# Patient Record
Sex: FEMALE | Race: WHITE | Employment: PART TIME | URBAN - METROPOLITAN AREA
[De-identification: names, ages, dates, MRNs, and addresses within clinical notes are randomized per-mention and may not be internally consistent; named-entity substitution may affect disease eponyms.]

---

## 2024-03-05 ENCOUNTER — TELEPHONE (OUTPATIENT)
Age: 24
End: 2024-03-05

## 2024-03-06 ENCOUNTER — TELEPHONE (OUTPATIENT)
Age: 24
End: 2024-03-06

## 2024-04-08 ENCOUNTER — TELEMEDICINE (OUTPATIENT)
Age: 24
End: 2024-04-08
Payer: COMMERCIAL

## 2024-04-08 DIAGNOSIS — F90.2 ATTENTION DEFICIT HYPERACTIVITY DISORDER (ADHD), COMBINED TYPE: Primary | ICD-10-CM

## 2024-04-08 LAB
11-NOR-9-THC-9-COOH, POC: NEGATIVE
AMPHETAMINE, URINE, POC: NEGATIVE
BENZODIAZEPINES, URINE, POC: NORMAL
BENZOYLECGONINE, URINE, POC: NEGATIVE
METHADONE, URINE, POC: NEGATIVE
METHAMPHETAMINE, URINE, POC: NEGATIVE
MORPHINE, URINE, POC: NEGATIVE
PHENCYCLIDINE, URINE, POC: NEGATIVE
URINALYSIS COLOR, POC: NORMAL

## 2024-04-08 PROCEDURE — 80305 DRUG TEST PRSMV DIR OPT OBS: CPT | Performed by: STUDENT IN AN ORGANIZED HEALTH CARE EDUCATION/TRAINING PROGRAM

## 2024-04-08 PROCEDURE — 99214 OFFICE O/P EST MOD 30 MIN: CPT | Performed by: STUDENT IN AN ORGANIZED HEALTH CARE EDUCATION/TRAINING PROGRAM

## 2024-04-08 RX ORDER — ESOMEPRAZOLE MAGNESIUM 40 MG/1
40 CAPSULE, DELAYED RELEASE ORAL
COMMUNITY

## 2024-04-08 NOTE — PROGRESS NOTES
Chief Complaint   Patient presents with    ADHD         \"Have you been to the ER, urgent care clinic since your last visit?  Hospitalized since your last visit?\"    NO    “Have you seen or consulted any other health care providers outside of [facility] since your last visit?”    NO            Click Here for Release of Records Request    
tretinoin (RETIN-A) 0.025 % cream APPLY A THIN FILM TO AFFECTED AREA ONCE DAILY IN THE EVENING    omeprazole (PRILOSEC OTC) 20 MG tablet Take 1 tablet by mouth daily (Patient not taking: Reported on 3/4/2024)     No current facility-administered medications for this visit.       Physical Exam  Nursing note reviewed.   Constitutional:       General: She is not in acute distress.     Appearance: Normal appearance. She is not ill-appearing or toxic-appearing.   HENT:      Head: Normocephalic and atraumatic.   Eyes:      General:         Right eye: No discharge.         Left eye: No discharge.      Conjunctiva/sclera: Conjunctivae normal.   Pulmonary:      Effort: Pulmonary effort is normal. No respiratory distress (able to speak in complete sentences).   Musculoskeletal:      Cervical back: No rigidity.   Neurological:      Mental Status: She is alert.      Comments: No obvious neurological deficits within the limits of virtual encounter.   Psychiatric:         Mood and Affect: Mood normal.         Behavior: Behavior normal.         Thought Content: Thought content normal.         Judgment: Judgment normal.               I was located in the office while conducting this encounter.    Total Time: minutes: 11-20 minutes      Chelo Davidson is a 23 y.o. female who was evaluated through a synchronous (real-time) audio-video encounter. The patient (or guardian if applicable) is aware that this is a billable service, which includes applicable co-pays. This Virtual Visit was conducted with patient's (and/or legal guardian's) consent. Patient identification was verified, and a caregiver was present when appropriate.     --Alisa Jang MD on 4/8/24 at 11:23 AM EDT      An electronic signature was used to authenticate this note.

## 2024-04-09 ENCOUNTER — CLINICAL DOCUMENTATION (OUTPATIENT)
Age: 24
End: 2024-04-09

## 2024-04-09 NOTE — PROGRESS NOTES
[facility] office note was put on Dr Jang's desk

## 2024-04-10 RX ORDER — DEXTROAMPHETAMINE SACCHARATE, AMPHETAMINE ASPARTATE MONOHYDRATE, DEXTROAMPHETAMINE SULFATE AND AMPHETAMINE SULFATE 2.5; 2.5; 2.5; 2.5 MG/1; MG/1; MG/1; MG/1
10 CAPSULE, EXTENDED RELEASE ORAL DAILY
Qty: 30 CAPSULE | Refills: 0 | Status: SHIPPED | OUTPATIENT
Start: 2024-04-10 | End: 2024-05-10

## 2024-04-11 ENCOUNTER — TELEPHONE (OUTPATIENT)
Age: 24
End: 2024-04-11

## 2024-04-11 NOTE — TELEPHONE ENCOUNTER
Pt called in and states she needs a PA for amphetamine-dextroamphetamine (ADDERALL XR) 10 MG extended release capsule due to insurance refusing to pay due to her age.

## 2024-04-15 ENCOUNTER — CLINICAL DOCUMENTATION (OUTPATIENT)
Age: 24
End: 2024-04-15

## 2024-04-22 ENCOUNTER — TELEMEDICINE (OUTPATIENT)
Age: 24
End: 2024-04-22
Payer: COMMERCIAL

## 2024-04-22 DIAGNOSIS — F90.2 ATTENTION DEFICIT HYPERACTIVITY DISORDER (ADHD), COMBINED TYPE: Primary | ICD-10-CM

## 2024-04-22 PROCEDURE — 99214 OFFICE O/P EST MOD 30 MIN: CPT | Performed by: STUDENT IN AN ORGANIZED HEALTH CARE EDUCATION/TRAINING PROGRAM

## 2024-04-22 RX ORDER — DEXTROAMPHETAMINE SACCHARATE, AMPHETAMINE ASPARTATE MONOHYDRATE, DEXTROAMPHETAMINE SULFATE AND AMPHETAMINE SULFATE 3.75; 3.75; 3.75; 3.75 MG/1; MG/1; MG/1; MG/1
15 CAPSULE, EXTENDED RELEASE ORAL DAILY
Qty: 15 CAPSULE | Refills: 0 | Status: SHIPPED | OUTPATIENT
Start: 2024-04-22 | End: 2024-05-07

## 2024-04-22 NOTE — PROGRESS NOTES
Progress Note    she is a 23 y.o. year old female who presents for evaluation ADHD (Patient states the medication is not lasting as long as she feels it should. ) and Medication Refill        Assessment/ Plan:     1. Attention deficit hyperactivity disorder (ADHD), combined type  Assessment & Plan:   Uncontrolled, changes made today: Increase Adderall dose  Orders:  -     amphetamine-dextroamphetamine (ADDERALL XR) 15 MG extended release capsule; Take 1 capsule by mouth daily for 15 days. Max Daily Amount: 15 mg, Disp-15 capsule, R-0Normal         Return in about 2 weeks (around 5/6/2024) for Follow-up ADHD, virtual visit fine.      I have discussed the diagnosis with the patient and the intended plan as seen in the above orders.    Medication Side Effects and Warnings were discussed with patient  The patient was instructed to access after-visit summary via [employer] and questions were answered concerning future plans.    Patient conveyed understanding of plan.           Subjective:     Chief Complaint   Patient presents with    ADHD     Patient states the medication is not lasting as long as she feels it should.     Medication Refill       The patient was located at Home: [id number] [place] [address]  [address]  [facility] [id number]    Started on Adderall  She feels like it has helped immensely, \"it has changed my life\"  She got an A on a test for the first time.  She is retaining information and studying better  She hits a wall around 8 hours, feels tired and needs a nap  No issues with insomnia or other side effects.  She is taking 1 day off      PMHx, RxHx, FmHx, SocHx, AllgHx were reviewed and updated in the chart.    Review of Systems - negative except as noted above      Objective:     Patient-Reported Vitals  Patient-Reported Weight: 153  Patient-Reported Height: 5’e         Current Outpatient Medications   Medication Sig    amphetamine-dextroamphetamine (ADDERALL XR) 15 MG extended release capsule Take 1 capsule by mouth

## 2024-04-22 NOTE — PROGRESS NOTES
Chief Complaint   Patient presents with    ADHD     Patient states the medication is not lasting as long as she feels it should.     Medication Refill     \"Have you been to the ER, urgent care clinic since your last visit?  Hospitalized since your last visit?\"    NO    “Have you seen or consulted any other health care providers outside of [facility] since your last visit?”    NO            Click Here for Release of Records Request

## 2024-05-06 ENCOUNTER — TELEMEDICINE (OUTPATIENT)
Age: 24
End: 2024-05-06
Payer: COMMERCIAL

## 2024-05-06 DIAGNOSIS — F90.2 ATTENTION DEFICIT HYPERACTIVITY DISORDER (ADHD), COMBINED TYPE: Primary | ICD-10-CM

## 2024-05-06 PROCEDURE — 99214 OFFICE O/P EST MOD 30 MIN: CPT | Performed by: STUDENT IN AN ORGANIZED HEALTH CARE EDUCATION/TRAINING PROGRAM

## 2024-05-06 RX ORDER — DEXTROAMPHETAMINE SACCHARATE, AMPHETAMINE ASPARTATE MONOHYDRATE, DEXTROAMPHETAMINE SULFATE AND AMPHETAMINE SULFATE 3.75; 3.75; 3.75; 3.75 MG/1; MG/1; MG/1; MG/1
15 CAPSULE, EXTENDED RELEASE ORAL DAILY
Qty: 30 CAPSULE | Refills: 0 | Status: SHIPPED | OUTPATIENT
Start: 2024-07-05 | End: 2024-08-04

## 2024-05-06 RX ORDER — DEXTROAMPHETAMINE SACCHARATE, AMPHETAMINE ASPARTATE MONOHYDRATE, DEXTROAMPHETAMINE SULFATE AND AMPHETAMINE SULFATE 3.75; 3.75; 3.75; 3.75 MG/1; MG/1; MG/1; MG/1
15 CAPSULE, EXTENDED RELEASE ORAL DAILY
Qty: 30 CAPSULE | Refills: 0 | Status: SHIPPED | OUTPATIENT
Start: 2024-06-05 | End: 2024-07-05

## 2024-05-06 RX ORDER — DEXTROAMPHETAMINE SACCHARATE, AMPHETAMINE ASPARTATE MONOHYDRATE, DEXTROAMPHETAMINE SULFATE AND AMPHETAMINE SULFATE 3.75; 3.75; 3.75; 3.75 MG/1; MG/1; MG/1; MG/1
15 CAPSULE, EXTENDED RELEASE ORAL DAILY
Qty: 30 CAPSULE | Refills: 0 | Status: SHIPPED | OUTPATIENT
Start: 2024-05-06 | End: 2024-06-05

## 2024-05-06 ASSESSMENT — PATIENT HEALTH QUESTIONNAIRE - PHQ9
SUM OF ALL RESPONSES TO PHQ9 QUESTIONS 1 & 2: 0
SUM OF ALL RESPONSES TO PHQ QUESTIONS 1-9: 0
1. LITTLE INTEREST OR PLEASURE IN DOING THINGS: NOT AT ALL
SUM OF ALL RESPONSES TO PHQ QUESTIONS 1-9: 0
2. FEELING DOWN, DEPRESSED OR HOPELESS: NOT AT ALL

## 2024-05-06 NOTE — PATIENT INSTRUCTIONS
Constipation management:    First goal is to make sure that all stools are soft. This can be accomplished with prunes/juice, fiber supplements or bulking laxatives such as MiraLAX and Metamucil.  You need to make sure you drink plenty of water in order for these to work.  You can increase these as needed to achieve soft stools, back off for diarrhea.     Start taking over-the-counter Colace daily.  This will help to moisten and lubricate the stools.     Once stools are soft consistency, I recommend taking senna for 1-5 days to help push the bowels along.  After you feel that your bowels have cleared, this can be taken as needed to help.  I would recommend 1-4 times per week.     Other options to help include docusate suppository or a Fleets enema.  These will typically work faster if you are wishing to do more in addition to the above recommendations.     Expect it will take 1-2 weeks for you to fully clear out.  After clear out, it is a good idea to continue the fiber and Colace to help maintain your bowels.

## 2024-05-06 NOTE — PROGRESS NOTES
Chief Complaint   Patient presents with    Medication Refill     New Dose of Adderall is working well.        Patient is here in [place] for the virtual visit.     Patient gave verbal consent today for CHUY.      \"Have you been to the ER, urgent care clinic since your last visit?  Hospitalized since your last visit?\"    NO    “Have you seen or consulted any other [facility]?”    NO            Click Here for Release of Records Request    
11:22 AM EDT  An electronic signature was used to authenticate this note.

## 2024-07-03 ENCOUNTER — TELEMEDICINE (OUTPATIENT)
Age: 24
End: 2024-07-03
Payer: COMMERCIAL

## 2024-07-03 DIAGNOSIS — F90.2 ATTENTION DEFICIT HYPERACTIVITY DISORDER (ADHD), COMBINED TYPE: Primary | ICD-10-CM

## 2024-07-03 PROCEDURE — 99214 OFFICE O/P EST MOD 30 MIN: CPT | Performed by: NURSE PRACTITIONER

## 2024-07-03 RX ORDER — DEXTROAMPHETAMINE SACCHARATE, AMPHETAMINE ASPARTATE, DEXTROAMPHETAMINE SULFATE AND AMPHETAMINE SULFATE 2.5; 2.5; 2.5; 2.5 MG/1; MG/1; MG/1; MG/1
10 TABLET ORAL DAILY
Qty: 30 TABLET | Refills: 0 | Status: SHIPPED | OUTPATIENT
Start: 2024-07-03 | End: 2024-08-02

## 2024-07-03 NOTE — PROGRESS NOTES
Chief Complaint   Patient presents with    ADD    Medication Adjustment     Patient is on virtual today for a med adjustment.  Patient stated she would like to try adderall IR.  No other concerns    Patient stated she  is in the [facility] at the time of this virtual visit.      \"Have you been to the ER, urgent care clinic since your last visit?  Hospitalized since your last visit?\"    NO    “Have you seen or consulted any other health care providers outside of [facility] since your last visit?”    NO     “Have you had a pap smear?”    NO    No cervical cancer screening on file             Click Here for Release of Records Request

## 2024-07-03 NOTE — PROGRESS NOTES
Chelo Davidson (:  2000) is a 23 y.o. female, Established patient, here for evaluation of the following chief complaint(s):  ADD and Medication Adjustment         Assessment & Plan  1. ADD.  A prescription for Adderall IR 10 mg, to be taken 30 minutes prior to work or productive activity, has been issued.    Advised that I did not want her taking XR and IR, against FDA recommendations  She is also advised to use IR only when active and not get in the habit of taking bid every day to decrease abuse potential  Will follow up in 1 mo for progress    Results    1. Attention deficit hyperactivity disorder (ADHD), combined type  -     amphetamine-dextroamphetamine (ADDERALL, 10MG,) 10 MG tablet; Take 1 tablet by mouth daily for 30 days. Max Daily Amount: 10 mg, Disp-30 tablet, R-0Normal    No follow-ups on file.       Subjective   History of Present Illness  The patient presents via virtual visit for evaluation of ADD.    The patient expresses a desire to transition from Adderall XR to Adderall IR, citing palpitations as a side effect. She maintains a daily workout routine and is currently training for a marathon in 2024. However, she occasionally finds herself not completing her workout until 2 p.m. and experiencing insomnia due to the late-day administration of the XR.. She expresses a preference for Adderall IR, which she believes would provide more flexibility in maintaining her workout routine. She prefers to take the medication during her work hours, three days a week, and estimates taking it four times a week when she is not engaged in any activities. Her 12-hour shifts, which initially provide relief for the full 12 hours, then last until approximately 10 p.m., and then 8 p.m. She took her medication at 6 a.m. yesterday at 10 a.m., but found it difficult to maintain focus. She believes her body is metabolizing the medication rapidly. She denies ever taking Vyvanse or Concerta, but expresses

## 2024-08-06 ENCOUNTER — TELEMEDICINE (OUTPATIENT)
Age: 24
End: 2024-08-06
Payer: COMMERCIAL

## 2024-08-06 DIAGNOSIS — M25.562 PAIN AND SWELLING OF LEFT KNEE: ICD-10-CM

## 2024-08-06 DIAGNOSIS — M25.462 PAIN AND SWELLING OF LEFT KNEE: ICD-10-CM

## 2024-08-06 DIAGNOSIS — F90.2 ATTENTION DEFICIT HYPERACTIVITY DISORDER (ADHD), COMBINED TYPE: Primary | ICD-10-CM

## 2024-08-06 PROCEDURE — 99214 OFFICE O/P EST MOD 30 MIN: CPT | Performed by: STUDENT IN AN ORGANIZED HEALTH CARE EDUCATION/TRAINING PROGRAM

## 2024-08-06 RX ORDER — LISDEXAMFETAMINE DIMESYLATE 20 MG/1
20 CAPSULE ORAL DAILY
Qty: 30 CAPSULE | Refills: 0 | Status: SHIPPED | OUTPATIENT
Start: 2024-08-06 | End: 2024-09-05

## 2024-08-06 NOTE — PROGRESS NOTES
Progress Note    she is a 23 y.o. year old female who presents for evaluation ADHD (3 month follow up)        Assessment/ Plan:     Assessment & Plan  1. Attention Deficit Hyperactivity Disorder (ADHD).  Given her partial response to Adderall, it is advisable to continue with the same class of medication but consider a different variant. A prescription for Vyvanse 20 mg has been issued to her pharmacy, [facility] on [address]. She is informed that Vyvanse may have similar side effects to Adderall, such as headaches and appetite suppression, and is advised to avoid caffeine and eat food when taking the medication. She is advised to schedule a virtual visit via [employer] within the next 2 to 4 weeks to assess her response to the new medication.    2. Left knee pain.  The pain is likely due to a strain and should improve with rest and rehabilitation exercises. An x-ray of the left knee has been ordered to rule out any fractures. She is advised to apply ice to the knee four times daily for 10 minutes each time, particularly before and after activities that may exacerbate the pain. Wearing a compression bandage and elevating the knee can help reduce swelling and discomfort. She is currently taking ibuprofen for pain management. A work note has been provided, excusing her from work until Friday. Rehabilitation exercises will be included in her after-visit summary. If the x-ray reveals a fracture, a referral to Orthopedics will be made. If the pain persists despite the recommended treatment, a referral to Physical Therapy will be considered.    Follow-up  The patient will follow up in 2 to 4 weeks.       1. Attention deficit hyperactivity disorder (ADHD), combined type  -     Lisdexamfetamine Dimesylate (VYVANSE) 20 MG CAPS; Take 1 capsule by mouth daily for 30 days. Max Daily Amount: 20 mg, Disp-30 capsule, R-0Normal  2. Pain and swelling of left knee  -     XR KNEE LEFT (MIN 4 VIEWS); Future         Return in about 4 weeks

## 2024-08-06 NOTE — PROGRESS NOTES
Patient has given verbal consent to use CHUY today.    Chelo Davidson is a 23 y.o. female , id x 2(name and ). Reviewed record, history, and  medications.    Chief Complaint   Patient presents with    ADHD     3 month follow up          Health Maintenance Due   Topic    Hepatitis B vaccine (1 of 3 - 3-dose series)    COVID-19 Vaccine (1)    Varicella vaccine (1 of 2 - 2-dose childhood series)    HIV screen     Hepatitis C screen     DTaP/Tdap/Td vaccine (1 - Tdap)    HPV vaccine (2 - 3-dose series)    Pap smear     Flu vaccine (1)         Wt Readings from Last 1 Encounters:   23 70.8 kg (156 lb)     BP Readings from Last 3 Encounters:   23 121/82   23 114/76   23 101/69     Pulse Readings from Last 1 Encounters:   23 97         Patient is currently accompanied by n/a & I have received verbal consent from Chelo Davidson to discuss any/all medical information while he/she is present in the room.    Home BP cuff present today:  no  Home medication list or bottles present today: no  Forms for completion: no    Chest pain/pressure/discomfort: no  Shortness of breath:  no      Depression Screening:  :     Depression: Not at risk (2024)    PHQ-2     PHQ-2 Score: 0   Recent Concern: Depression - At risk (3/4/2024)    PHQ-2     PHQ-2 Score: 4          Coordination of Care Questionnaire:  :     \"Have you been to the ER, urgent care clinic since your last visit?  Hospitalized since your last visit?\"    NO    “Have you seen or consulted any other health care providers outside of [facility] since your last visit?”    NO     “Have you had a pap smear?”    NO    No cervical cancer screening on file             Click Here for Release of Records Request        --Meghan Aleman MA       ------------------------------------------------

## 2024-08-20 ENCOUNTER — TELEMEDICINE (OUTPATIENT)
Age: 24
End: 2024-08-20
Payer: COMMERCIAL

## 2024-08-20 ENCOUNTER — TELEPHONE (OUTPATIENT)
Age: 24
End: 2024-08-20

## 2024-08-20 DIAGNOSIS — F90.2 ATTENTION DEFICIT HYPERACTIVITY DISORDER (ADHD), COMBINED TYPE: ICD-10-CM

## 2024-08-20 PROCEDURE — 99214 OFFICE O/P EST MOD 30 MIN: CPT | Performed by: STUDENT IN AN ORGANIZED HEALTH CARE EDUCATION/TRAINING PROGRAM

## 2024-08-20 RX ORDER — LISDEXAMFETAMINE DIMESYLATE 40 MG/1
40 CAPSULE ORAL DAILY
Qty: 30 CAPSULE | Refills: 0 | Status: SHIPPED | OUTPATIENT
Start: 2024-08-27 | End: 2024-09-26

## 2024-08-20 NOTE — TELEPHONE ENCOUNTER
Patient called and states that the Vyvanse is only working for 5 hours. She would like a call back to advise if the medication should be increased. Please call her at [phone]

## 2024-08-20 NOTE — PROGRESS NOTES
Patient confirms they are located here in [place] for this virtual visit today.    Patient gave verbal consent today for CHUY to be used during there virtual visit.    Records were requested today on the Patient's behalf.    Chelo Davidson is a 24 y.o. female , id x 2(name and ). Reviewed record, history, and  medications.    Chief Complaint   Patient presents with    Medication Check    1. Medication is only working for a short period of time. (Vyvanse)      Health Maintenance Due   Topic    Varicella vaccine (1 of 2 - 13+ 2-dose series)    HIV screen     Hepatitis C screen     Hepatitis B vaccine (1 of 3 - 19+ 3-dose series)    DTaP/Tdap/Td vaccine (1 - Tdap)    HPV vaccine (2 - 3-dose series)    Pap smear     COVID-19 Vaccine ( -  season)    Flu vaccine (1)         Wt Readings from Last 1 Encounters:   23 70.8 kg (156 lb)     BP Readings from Last 3 Encounters:   23 121/82   23 114/76   23 101/69     Pulse Readings from Last 1 Encounters:   23 97         Patient is currently accompanied by n/a & I have received verbal consent from Chelo Davidson to discuss any/all medical information while he/she is present in the room.    Home BP cuff present today:  no  Home medication list or bottles present today: no  Forms for completion: no    Chest pain/pressure/discomfort: no  Shortness of breath:  no      Depression Screening:  :     Depression: Not at risk (2024)    PHQ-2     PHQ-2 Score: 0   Recent Concern: Depression - At risk (3/4/2024)    PHQ-2     PHQ-2 Score: 4          Coordination of Care Questionnaire:  :     \"Have you been to the ER, urgent care clinic since your last visit?  Hospitalized since your last visit?\"    NO    “Have you seen or consulted any other health care providers outside of [facility] since your last visit?”    NO     “Have you had a pap smear?”    YES - Where: [facility] or [facility]  Nurse/CMA to request most recent records if not

## 2024-08-21 NOTE — PROGRESS NOTES
Virtual Visit Progress Note    she is a 24 y.o. year old female who presents for evaluation Medication Check        Assessment/ Plan:     Assessment & Plan  1. Attention-Deficit/Hyperactivity Disorder (ADHD).  The current dosage of Vyvanse is not lasting long enough, causing fatigue and distraction after five hours. She reports positive effects otherwise, including the ability to take antacids concurrently and maintaining an appetite. The dosage of Vyvanse will be increased to 40 mg, with two 20 mg doses to be taken in the morning. A prescription for the adjusted dosage will be sent to [facility] on [address]. She is advised to report any side effects experienced with this new regimen. If side effects occur, further adjustments will be considered.    2. Patellofemoral Syndrome.  She is currently undergoing physical therapy and continues to exercise daily. No changes to the current management plan are needed.       1. Attention deficit hyperactivity disorder (ADHD), combined type  -     Lisdexamfetamine Dimesylate 40 MG CAPS; Take 40 mg by mouth daily for 30 days. Max Daily Amount: 40 mg, Disp-30 capsule, R-0Normal         Return for as scheduled 9/3.      I have discussed the diagnosis with the patient and the intended plan as seen in the above orders.    Medication Side Effects and Warnings were discussed with patient  The patient was instructed to access after-visit summary via [employer] and questions were answered concerning future plans.    Patient conveyed understanding of plan.       Current Outpatient Medications   Medication Sig    [START ON 8/27/2024] Lisdexamfetamine Dimesylate 40 MG CAPS Take 40 mg by mouth daily for 30 days. Max Daily Amount: 40 mg    lansoprazole (PREVACID SOLUTAB) 15 MG disintegrating tablet Take 1 tablet by mouth daily    Norethindron-Ethinyl Estrad-Fe (TRI-LEGEST FE) 1-20/1-30/1-35 MG-MCG TABS Take 1 tablet by mouth daily    tretinoin (RETIN-A) 0.025 % cream APPLY A THIN FILM TO AFFECTED

## 2024-08-22 ENCOUNTER — CLINICAL DOCUMENTATION (OUTPATIENT)
Age: 24
End: 2024-08-22

## 2024-08-28 ENCOUNTER — TELEPHONE (OUTPATIENT)
Age: 24
End: 2024-08-28

## 2024-08-28 DIAGNOSIS — F90.2 ATTENTION DEFICIT HYPERACTIVITY DISORDER (ADHD), COMBINED TYPE: Primary | ICD-10-CM

## 2024-08-28 RX ORDER — LISDEXAMFETAMINE DIMESYLATE 40 MG/1
40 CAPSULE ORAL DAILY
Qty: 30 CAPSULE | Refills: 0 | Status: SHIPPED | OUTPATIENT
Start: 2024-08-28 | End: 2024-09-27

## 2024-08-28 NOTE — TELEPHONE ENCOUNTER
Pt is calling about her Lisdexamfetamine Dimesylate 40 MG CAPS . [facility] is telling pt that her insurance will pay for the vyvanse name brand pt is at the [facility] waiting

## 2024-08-28 NOTE — TELEPHONE ENCOUNTER
Spoke to the patient , informed her of the new script that was sent in.  She stated the pharmacy said the script must say Brand name is Medically necessary.

## 2024-09-03 ENCOUNTER — TELEMEDICINE (OUTPATIENT)
Age: 24
End: 2024-09-03
Payer: COMMERCIAL

## 2024-09-03 DIAGNOSIS — F90.2 ATTENTION DEFICIT HYPERACTIVITY DISORDER (ADHD), COMBINED TYPE: ICD-10-CM

## 2024-09-03 PROCEDURE — 99214 OFFICE O/P EST MOD 30 MIN: CPT | Performed by: STUDENT IN AN ORGANIZED HEALTH CARE EDUCATION/TRAINING PROGRAM

## 2024-09-03 RX ORDER — LISDEXAMFETAMINE DIMESYLATE 40 MG/1
40 CAPSULE ORAL DAILY
Qty: 30 CAPSULE | Refills: 0 | Status: SHIPPED | OUTPATIENT
Start: 2024-09-27 | End: 2024-10-27

## 2024-09-03 RX ORDER — LISDEXAMFETAMINE DIMESYLATE 40 MG/1
40 CAPSULE ORAL DAILY
Qty: 30 CAPSULE | Refills: 0 | Status: SHIPPED | OUTPATIENT
Start: 2024-11-25 | End: 2024-11-28

## 2024-09-03 RX ORDER — LISDEXAMFETAMINE DIMESYLATE 40 MG/1
40 CAPSULE ORAL DAILY
Qty: 30 CAPSULE | Refills: 0 | Status: SHIPPED | OUTPATIENT
Start: 2024-10-26 | End: 2024-11-25

## 2024-09-03 NOTE — PROGRESS NOTES
1-20/1-30/1-35 MG-MCG TABS Take 1 tablet by mouth daily    tretinoin (RETIN-A) 0.025 % cream APPLY A THIN FILM TO AFFECTED AREA ONCE DAILY IN THE EVENING     No current facility-administered medications for this visit.         Subjective:     Chief Complaint   Patient presents with    Medication Refill       History of Present Illness  The patient presents for evaluation of multiple medical concerns.    She prefers name brand Vyvanse over generic form. She reports no side effects from the medication.   She began experiencing a runny nose and slight sneezing yesterday. She is uncertain if these symptoms are due to allergies or a cold. She is seeking advice on which medications she can take in conjunction with Vyvanse.   She typically does not typically exercise before taking her medication. However, she noticed an elevated heart rate after running after her medication and an exam yesterday.         PMHx, RxHx, FmHx, SocHx, AllgHx were reviewed and updated in the chart.    Review of Systems - negative except as noted above      Objective:     Patient-Reported Vitals  No data recorded     Physical Exam  Nursing note reviewed.   Constitutional:       General: She is not in acute distress.     Appearance: Normal appearance. She is not ill-appearing or toxic-appearing.   HENT:      Head: Normocephalic and atraumatic.   Eyes:      General:         Right eye: No discharge.         Left eye: No discharge.      Conjunctiva/sclera: Conjunctivae normal.   Pulmonary:      Effort: Pulmonary effort is normal. No respiratory distress (able to speak in complete sentences).   Musculoskeletal:      Cervical back: No rigidity.   Neurological:      Mental Status: She is alert.      Comments: No obvious neurological deficits within the limits of virtual encounter.   Psychiatric:         Mood and Affect: Mood normal.         Behavior: Behavior normal.         Thought Content: Thought content normal.         Judgment: Judgment normal.

## 2024-11-05 ENCOUNTER — HOSPITAL ENCOUNTER (EMERGENCY)
Facility: HOSPITAL | Age: 24
Discharge: HOME | End: 2024-11-05
Attending: STUDENT IN AN ORGANIZED HEALTH CARE EDUCATION/TRAINING PROGRAM
Payer: COMMERCIAL

## 2024-11-05 ENCOUNTER — APPOINTMENT (OUTPATIENT)
Facility: HOSPITAL | Age: 24
End: 2024-11-05
Payer: COMMERCIAL

## 2024-11-05 VITALS
RESPIRATION RATE: 21 BRPM | HEIGHT: 63 IN | SYSTOLIC BLOOD PRESSURE: 137 MMHG | OXYGEN SATURATION: 96 % | BODY MASS INDEX: 24.1 KG/M2 | TEMPERATURE: 98 F | HEART RATE: 89 BPM | WEIGHT: 136 LBS | DIASTOLIC BLOOD PRESSURE: 90 MMHG

## 2024-11-05 DIAGNOSIS — R00.2 PALPITATIONS: ICD-10-CM

## 2024-11-05 DIAGNOSIS — R07.89 ATYPICAL CHEST PAIN: Primary | ICD-10-CM

## 2024-11-05 LAB
ALBUMIN SERPL-MCNC: 4 G/DL (ref 3.5–5)
ALBUMIN/GLOB SERPL: 1.1 (ref 1.1–2.2)
ALP SERPL-CCNC: 72 U/L (ref 45–117)
ALT SERPL-CCNC: 14 U/L (ref 12–78)
ANION GAP SERPL CALC-SCNC: 5 MMOL/L (ref 2–12)
AST SERPL-CCNC: 7 U/L (ref 15–37)
BASOPHILS # BLD: 0 K/UL (ref 0–0.1)
BASOPHILS NFR BLD: 0 % (ref 0–1)
BILIRUB SERPL-MCNC: 0.4 MG/DL (ref 0.2–1)
BUN SERPL-MCNC: 10 MG/DL (ref 6–20)
BUN/CREAT SERPL: 12 (ref 12–20)
CALCIUM SERPL-MCNC: 9.5 MG/DL (ref 8.5–10.1)
CHLORIDE SERPL-SCNC: 104 MMOL/L (ref 97–108)
CO2 SERPL-SCNC: 27 MMOL/L (ref 21–32)
CREAT SERPL-MCNC: 0.86 MG/DL (ref 0.55–1.02)
D DIMER PPP FEU-MCNC: 0.4 MG/L FEU (ref 0–0.65)
DIFFERENTIAL METHOD BLD: NORMAL
EOSINOPHIL # BLD: 0.2 K/UL (ref 0–0.4)
EOSINOPHIL NFR BLD: 2 % (ref 0–7)
ERYTHROCYTE [DISTWIDTH] IN BLOOD BY AUTOMATED COUNT: 12 % (ref 11.5–14.5)
GLOBULIN SER CALC-MCNC: 3.7 G/DL (ref 2–4)
GLUCOSE SERPL-MCNC: 106 MG/DL (ref 65–100)
HCT VFR BLD AUTO: 43.3 % (ref 35–47)
HGB BLD-MCNC: 14.4 G/DL (ref 11.5–16)
IMM GRANULOCYTES # BLD AUTO: 0 K/UL (ref 0–0.04)
IMM GRANULOCYTES NFR BLD AUTO: 0 % (ref 0–0.5)
LYMPHOCYTES # BLD: 3.3 K/UL (ref 0.8–3.5)
LYMPHOCYTES NFR BLD: 48 % (ref 12–49)
MAGNESIUM SERPL-MCNC: 2 MG/DL (ref 1.6–2.4)
MCH RBC QN AUTO: 28.4 PG (ref 26–34)
MCHC RBC AUTO-ENTMCNC: 33.3 G/DL (ref 30–36.5)
MCV RBC AUTO: 85.4 FL (ref 80–99)
MONOCYTES # BLD: 0.4 K/UL (ref 0–1)
MONOCYTES NFR BLD: 5 % (ref 5–13)
NEUTS SEG # BLD: 3.2 K/UL (ref 1.8–8)
NEUTS SEG NFR BLD: 45 % (ref 32–75)
NRBC # BLD: 0 K/UL (ref 0–0.01)
NRBC BLD-RTO: 0 PER 100 WBC
PLATELET # BLD AUTO: 272 K/UL (ref 150–400)
PMV BLD AUTO: 9.4 FL (ref 8.9–12.9)
POTASSIUM SERPL-SCNC: 3.4 MMOL/L (ref 3.5–5.1)
PROT SERPL-MCNC: 7.7 G/DL (ref 6.4–8.2)
RBC # BLD AUTO: 5.07 M/UL (ref 3.8–5.2)
SODIUM SERPL-SCNC: 136 MMOL/L (ref 136–145)
TROPONIN I SERPL HS-MCNC: <4 NG/L (ref 0–51)
TSH SERPL DL<=0.05 MIU/L-ACNC: 3.61 UIU/ML (ref 0.36–3.74)
WBC # BLD AUTO: 7 K/UL (ref 3.6–11)

## 2024-11-05 PROCEDURE — 71045 X-RAY EXAM CHEST 1 VIEW: CPT

## 2024-11-05 PROCEDURE — 83735 ASSAY OF MAGNESIUM: CPT

## 2024-11-05 PROCEDURE — 93005 ELECTROCARDIOGRAM TRACING: CPT | Performed by: STUDENT IN AN ORGANIZED HEALTH CARE EDUCATION/TRAINING PROGRAM

## 2024-11-05 PROCEDURE — 85379 FIBRIN DEGRADATION QUANT: CPT

## 2024-11-05 PROCEDURE — 36415 COLL VENOUS BLD VENIPUNCTURE: CPT

## 2024-11-05 PROCEDURE — 84443 ASSAY THYROID STIM HORMONE: CPT

## 2024-11-05 PROCEDURE — 85025 COMPLETE CBC W/AUTO DIFF WBC: CPT

## 2024-11-05 PROCEDURE — 6370000000 HC RX 637 (ALT 250 FOR IP): Performed by: STUDENT IN AN ORGANIZED HEALTH CARE EDUCATION/TRAINING PROGRAM

## 2024-11-05 PROCEDURE — 99285 EMERGENCY DEPT VISIT HI MDM: CPT

## 2024-11-05 PROCEDURE — 84484 ASSAY OF TROPONIN QUANT: CPT

## 2024-11-05 PROCEDURE — 80053 COMPREHEN METABOLIC PANEL: CPT

## 2024-11-05 RX ORDER — POTASSIUM CHLORIDE 750 MG/1
40 TABLET, EXTENDED RELEASE ORAL ONCE
Status: COMPLETED | OUTPATIENT
Start: 2024-11-05 | End: 2024-11-05

## 2024-11-05 RX ADMIN — POTASSIUM CHLORIDE 40 MEQ: 750 TABLET, EXTENDED RELEASE ORAL at 14:12

## 2024-11-05 ASSESSMENT — LIFESTYLE VARIABLES
HOW MANY STANDARD DRINKS CONTAINING ALCOHOL DO YOU HAVE ON A TYPICAL DAY: PATIENT DOES NOT DRINK
HOW OFTEN DO YOU HAVE A DRINK CONTAINING ALCOHOL: NEVER

## 2024-11-05 ASSESSMENT — PAIN - FUNCTIONAL ASSESSMENT: PAIN_FUNCTIONAL_ASSESSMENT: 0-10

## 2024-11-05 ASSESSMENT — PAIN SCALES - GENERAL: PAINLEVEL_OUTOF10: 5

## 2024-11-05 NOTE — ED NOTES
Pt was discharged by Dr Mary Jane Sanderson  Pt verbalized good understanding of all discharge instructions, and follow up care.   All questions answered.  Pt in stable condition on discharge.

## 2024-11-05 NOTE — DISCHARGE INSTRUCTIONS
- Increase dietary potassium  - Follow up with cardiology as discussed to be reevaluated as soon as possible, consider outpatient Holter monitoring  - Return immediately to the ER for recurrent or worsening chest pain, difficulty breathing, abdominal pain, back pain, episodes of passing out, or any new or worsening symptoms

## 2024-11-05 NOTE — ED TRIAGE NOTES
Pt presents to the ER today for chest tightness and shortness or breath.  Pt states this has been going on for a few days.  Pt states this morning her HR was in the 120's.

## 2024-11-06 LAB
EKG ATRIAL RATE: 102 BPM
EKG DIAGNOSIS: NORMAL
EKG P AXIS: 49 DEGREES
EKG P-R INTERVAL: 112 MS
EKG Q-T INTERVAL: 332 MS
EKG QRS DURATION: 84 MS
EKG QTC CALCULATION (BAZETT): 432 MS
EKG R AXIS: 88 DEGREES
EKG T AXIS: 56 DEGREES
EKG VENTRICULAR RATE: 102 BPM

## 2024-11-06 PROCEDURE — 93010 ELECTROCARDIOGRAM REPORT: CPT | Performed by: INTERNAL MEDICINE

## 2024-11-12 ENCOUNTER — TELEPHONE (OUTPATIENT)
Age: 24
End: 2024-11-12

## 2024-12-10 ENCOUNTER — TELEMEDICINE (OUTPATIENT)
Facility: CLINIC | Age: 24
End: 2024-12-10
Payer: COMMERCIAL

## 2024-12-10 DIAGNOSIS — M54.50 CHRONIC BILATERAL LOW BACK PAIN WITHOUT SCIATICA: ICD-10-CM

## 2024-12-10 DIAGNOSIS — R22.0 FACIAL SWELLING: Primary | ICD-10-CM

## 2024-12-10 DIAGNOSIS — T78.3XXA ANGIOEDEMA, INITIAL ENCOUNTER: ICD-10-CM

## 2024-12-10 DIAGNOSIS — F90.2 ATTENTION DEFICIT HYPERACTIVITY DISORDER (ADHD), COMBINED TYPE: ICD-10-CM

## 2024-12-10 DIAGNOSIS — G89.29 CHRONIC BILATERAL LOW BACK PAIN WITHOUT SCIATICA: ICD-10-CM

## 2024-12-10 PROCEDURE — 99214 OFFICE O/P EST MOD 30 MIN: CPT | Performed by: STUDENT IN AN ORGANIZED HEALTH CARE EDUCATION/TRAINING PROGRAM

## 2024-12-10 RX ORDER — LISDEXAMFETAMINE DIMESYLATE 40 MG/1
40 CAPSULE ORAL DAILY
Qty: 30 CAPSULE | Refills: 0 | Status: SHIPPED | OUTPATIENT
Start: 2025-02-08 | End: 2025-03-10

## 2024-12-10 RX ORDER — EPINEPHRINE 0.3 MG/.3ML
0.3 INJECTION SUBCUTANEOUS ONCE
Qty: 2 EACH | Refills: 2 | Status: SHIPPED | OUTPATIENT
Start: 2024-12-10 | End: 2024-12-10

## 2024-12-10 RX ORDER — CYCLOBENZAPRINE HCL 10 MG
10 TABLET ORAL 3 TIMES DAILY PRN
Qty: 30 TABLET | Refills: 2 | Status: SHIPPED | OUTPATIENT
Start: 2024-12-10

## 2024-12-10 RX ORDER — LISDEXAMFETAMINE DIMESYLATE 40 MG/1
40 CAPSULE ORAL DAILY
Qty: 30 CAPSULE | Refills: 0 | Status: CANCELLED | OUTPATIENT
Start: 2024-12-10 | End: 2025-01-09

## 2024-12-10 RX ORDER — LISDEXAMFETAMINE DIMESYLATE 40 MG/1
40 CAPSULE ORAL DAILY
Qty: 30 CAPSULE | Refills: 0 | Status: SHIPPED | OUTPATIENT
Start: 2024-12-10 | End: 2025-01-09

## 2024-12-10 RX ORDER — LISDEXAMFETAMINE DIMESYLATE 40 MG/1
40 CAPSULE ORAL DAILY
Qty: 30 CAPSULE | Refills: 0 | Status: SHIPPED | OUTPATIENT
Start: 2025-01-09 | End: 2025-02-08

## 2024-12-10 NOTE — PROGRESS NOTES
The patient (or guardian, if applicable) and other individuals in attendance with the patient were advised that Artificial Intelligence will be utilized during this visit to record, process the conversation to generate a clinical note, and support improvement of the AI technology. The patient (or guardian, if applicable) and other individuals in attendance at the appointment consented to the use of AI, including the recording.      Patient confirms they are located here in [place] for this virtual visit today.    Chelo Davidson is a 24 y.o. female , id x 2(name and ). Reviewed record, history, and  medications.    Chief Complaint   Patient presents with    Medication Refill        Health Maintenance Due   Topic    Varicella vaccine (1 of 2 - 13+ 2-dose series)    HIV screen     Hepatitis C screen     Hepatitis B vaccine (1 of 3 - 19+ 3-dose series)    DTaP/Tdap/Td vaccine (1 - Tdap)    HPV vaccine (2 - 3-dose series)    Flu vaccine (1)    COVID-19 Vaccine ( season)    Pap smear        Wt Readings from Last 1 Encounters:   24 61.7 kg (136 lb)     BP Readings from Last 3 Encounters:   24 (!) 137/90   23 121/82   23 114/76     Pulse Readings from Last 1 Encounters:   24 89       Surgery within the next month: no    Forms for completion: no    Chest pain/SOB: no        Depression Screening:  :     Depression: Not at risk (2024)    PHQ-2     PHQ-2 Score: 0   Recent Concern: Depression - At risk (3/4/2024)    PHQ-2     PHQ-2 Score: 4          Coordination of Care Questionnaire:  :     \"Have you been to the ER, urgent care clinic since your last visit?  Hospitalized since your last visit?\"    NO    “Have you seen or consulted any other health care providers outside of [facility] since your last visit?”    NO     “Have you had a pap smear?”    YES - Where: Pap Report, received that on 24. Nurse/CMA to request most recent records if not in the

## 2024-12-11 NOTE — PROGRESS NOTES
Virtual Visit Progress Note    she is a 24 y.o. year old female who presents for evaluation Medication Refill        Assessment/ Plan:     Assessment & Plan  1. Facial swelling.  The patient's symptoms have shown improvement, negating the immediate need for a steroid course. She is advised to consult with an allergist and recommence her antihistamine regimen. Concurrently, she should initiate Pepcid therapy. An EpiPen prescription will be provided for emergency use. She is instructed to contact emergency services if her symptoms escalate to involve her airway. She is also advised to avoid consuming Ginny's Kisses with strawberry. She will continue her esomeprazole and Vyvanse medications. If the antihistamines induce drowsiness, she may adjust the timing of administration to the evening.    2. Chronic lower back pain.  A prescription for Flexeril 10 mg will be provided for as-needed use. She is encouraged to practice healthy lifting techniques. A referral for physical therapy will be made, and she will inform us of her preferred location. She is advised to verify with her insurance provider that [facility] is within her network. Chiropractic treatment is not recommended without concurrent physical therapy.    3. Medication management.  She takes esomeprazole every morning for acid reflux and Vyvanse most days of the week, with rest days. She has a few pills of Vyvanse left because she has been taking vacations. She will go to [facility] in 3 or 4 days because they make her wait until she has zero left. She has an appointment scheduled for 03/01/2025 and would like to switch it to virtual. Discussed that the [facility] did give allowance for continuing virtual visits in 2025, but she still needs to do one visit a year in office.    Follow-up  The patient is scheduled for a follow-up visit in 03/2025.       1. Facial swelling  -     EPINEPHrine (EPIPEN 2-DEREK) 0.3 MG/0.3ML SOAJ injection; Inject 0.3 mLs into the muscle once

## 2025-01-06 ENCOUNTER — PATIENT MESSAGE (OUTPATIENT)
Facility: CLINIC | Age: 25
End: 2025-01-06